# Patient Record
Sex: MALE | Race: WHITE | NOT HISPANIC OR LATINO | Employment: STUDENT | URBAN - NONMETROPOLITAN AREA
[De-identification: names, ages, dates, MRNs, and addresses within clinical notes are randomized per-mention and may not be internally consistent; named-entity substitution may affect disease eponyms.]

---
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
## 2025-04-16 ENCOUNTER — OFFICE VISIT (OUTPATIENT)
Dept: FAMILY MEDICINE CLINIC | Facility: CLINIC | Age: 20
End: 2025-04-16

## 2025-04-16 VITALS
BODY MASS INDEX: 24.22 KG/M2 | SYSTOLIC BLOOD PRESSURE: 120 MMHG | TEMPERATURE: 98.3 F | RESPIRATION RATE: 18 BRPM | WEIGHT: 173 LBS | HEART RATE: 50 BPM | DIASTOLIC BLOOD PRESSURE: 60 MMHG | HEIGHT: 71 IN | OXYGEN SATURATION: 97 %

## 2025-04-16 DIAGNOSIS — J10.1 INFLUENZA A: Primary | ICD-10-CM

## 2025-04-16 NOTE — LETTER
April 16, 2025     Patient: Martin Schneider   YOB: 2005   Date of Visit: 4/16/2025       To Whom it May Concern:    Martin Schneider was seen in my clinic on 4/16/2025. He has been diagnosed with flu A. He may return to school and practice in three days.         Sincerely,          Kassidy Gregory PA-C        CC: No Recipients

## 2025-04-16 NOTE — PROGRESS NOTES
".Chief Complaint  URI and Sore Throat    Subjective          History of Present Illness  Martin Schneider is here today with his  History of Present Illness  The patient presents for evaluation of influenza A.    Symptoms began on Monday, including rhinorrhea, a mild cough, and a slight elevation in temperature. The cough is not severe enough to disrupt sleep. No pharmacological interventions have been sought for symptom relief. A past medical history of bronchitis, which occurred a few years ago, is reported, but there is no history of asthma. Self-medication with ibuprofen has been utilized.    Objective   Vital Signs:   /60 (BP Location: Left arm, Patient Position: Sitting, Cuff Size: Adult)   Pulse 50   Temp 98.3 °F (36.8 °C) (Oral)   Resp 18   Ht 180.3 cm (70.98\")   Wt 78.5 kg (173 lb)   SpO2 97%   BMI 24.14 kg/m²     Body mass index is 24.14 kg/m².  Pediatric BMI = 65 %ile (Z= 0.38) based on CDC (Boys, 2-20 Years) BMI-for-age based on BMI available on 4/16/2025.. BMI is within normal parameters. No other follow-up for BMI required.      Review of Systems    No current outpatient medications on file.    Allergies: Patient has no known allergies.    Physical Exam  Vitals and nursing note reviewed.   Constitutional:       General: He is not in acute distress.     Appearance: Normal appearance. He is ill-appearing. He is not toxic-appearing or diaphoretic.   HENT:      Head: Normocephalic and atraumatic.      Right Ear: Tympanic membrane, ear canal and external ear normal.      Left Ear: Tympanic membrane, ear canal and external ear normal.      Nose: Nose normal. Congestion present.      Mouth/Throat:      Mouth: Mucous membranes are moist.      Pharynx: No oropharyngeal exudate or posterior oropharyngeal erythema.   Eyes:      Pupils: Pupils are equal, round, and reactive to light.   Cardiovascular:      Rate and Rhythm: Normal rate and regular rhythm.      Heart sounds: Normal heart sounds. "   Pulmonary:      Effort: Pulmonary effort is normal.      Breath sounds: Normal breath sounds.   Neurological:      General: No focal deficit present.      Mental Status: He is alert.   Psychiatric:         Mood and Affect: Mood normal.         Behavior: Behavior normal.      Physical Exam  Mouth/Throat: Mucous membranes moist, no erythema, no exudate  Respiratory: Clear to auscultation, no wheezing, rales or rhonchi    Result Review :          Results  Labs   - Influenza A test: Positive             Assessment and Plan    Diagnoses and all orders for this visit:    1. Influenza A (Primary)      Assessment & Plan    - Positive test result for influenza A.  - Lungs auscultated clear.  - Discussion included the use of over-the-counter medications for symptom relief. Samples of Zyrtec once daily, and Mucinex and Delsym were provided with instructions for use. Advised rest and hydration. Also to continue ibuprofen several times a day  - Recommended wearing a mask when on campus to prevent transmission. Provided a note to excuse from classes and practice for the rest of the week.      Follow Up   No follow-ups on file.  Patient was given instructions and counseling regarding his condition or for health maintenance advice. Please see specific information pulled into the AVS if appropriate.     Patient or patient representative verbalized consent for the use of Ambient Listening during the visit with  JIL Thomson for chart documentation. 4/16/2025  12:42 EDT    JIL Thomson  04/16/2025